# Patient Record
Sex: FEMALE | Race: WHITE
[De-identification: names, ages, dates, MRNs, and addresses within clinical notes are randomized per-mention and may not be internally consistent; named-entity substitution may affect disease eponyms.]

---

## 2018-01-30 ENCOUNTER — HOSPITAL ENCOUNTER (OUTPATIENT)
Dept: HOSPITAL 96 - M.ULTRA | Age: 68
End: 2018-01-30
Attending: INTERNAL MEDICINE
Payer: MEDICARE

## 2018-01-30 DIAGNOSIS — I87.2: Primary | ICD-10-CM

## 2020-10-06 ENCOUNTER — HOSPITAL ENCOUNTER (OUTPATIENT)
Dept: HOSPITAL 96 - M.ULTRA | Age: 70
End: 2020-10-06
Attending: INTERNAL MEDICINE
Payer: MEDICARE

## 2020-10-06 DIAGNOSIS — M25.462: Primary | ICD-10-CM

## 2020-10-06 DIAGNOSIS — M71.22: ICD-10-CM

## 2020-12-07 ENCOUNTER — HOSPITAL ENCOUNTER (OUTPATIENT)
Dept: HOSPITAL 96 - M.CRD | Age: 70
End: 2020-12-07
Attending: INTERNAL MEDICINE
Payer: MEDICARE

## 2020-12-07 DIAGNOSIS — R94.31: ICD-10-CM

## 2020-12-07 DIAGNOSIS — I07.1: Primary | ICD-10-CM

## 2020-12-07 NOTE — CARDNUC
[place] [address]
Phone:  [phone]                     CARDIAC NUCLEAR IMAGING REPORT
_______________________________________________________________________________
 
Name:         YESICLAUDIA KATHERIN          Room:                     REG CLI
M.R.#:    [id number]     Account #:     [id number]  
Admission:    12/07/20    Attend Phys:   LAURA Bass, 
Discharge:                Date of Birth: [birth year]  
Date of Service: 12/07/20 1721  Report #:      [id number]
        [id number] 
_______________________________________________________________________________
THIS REPORT FOR:
 
cc:  Lucille Parker MD, Lin W. MD Liston, Michael J. MD [facility]     
                                                                       ~
 
--------------- APPROVED REPORT --------------
 
 
Study performed:  12/07/2020 15:40:06
 
Exam: Nuclear Stress Test
Indication: Chest pain, ABN EKG, Palpitations.
Patient Location: Out-Patient
Stress Tech: Emily Godoy
Stress Nurse: Staci Tolbert R.N.
NM Tech:SIVA Thayer
 
Ht: 5 ft 4 in  Wt: 163 lbs  BSA:  1.79 m2
    BMI:  27.97
 
Medical History
Medical History: Chest pain, ABN EKG, Palpitations, SB, IVCD, 
Catherization, Superficial venous thrombosis, Knee 
pain/Limping.
Medications: No cardiac medications.
Allergies: Hydromorphone, Darvocet, Amoxicillin, 
Propoxyphene.
Cardiac Risk Factors: Age, FHX of CAD, ABN EKG, Palpitations, SB, 
IVCD, past catherization.
Previous Cardiac Procedures: Catherization 2010.
Pretest Chest Pain Characteristics: No chest pain
Exercise History: Indeterminate
Physical Disabilities: Knee pain, limping. Patient refuses treadmill 
d/t knee pain.
Meds Held (24 hrs): None
 
Stress Test Details
Stress Test:  Pharmacologic stress testing performed using 0.4 mg of 
regadenoson per 5 mL given IV over 10 seconds.      
  Reason for pharmacologic stress test: Knee pain, limping. Patient 
refuses treadmill d/t knee pain..      
  Reversal agent Aminophyline  mg, given intravenously for patient 
refused PO caffeine..      
HR           
 
 
[place] [address]
Phone:  [phone]                     CARDIAC NUCLEAR IMAGING REPORT
_______________________________________________________________________________
 
Name:         CLAUDIA KEY          Room:                     REG CLI
M.R.#:    [id number]     Account #:     [id number]  
Admission:    12/07/20    Attend Phys:   LAURA Bass, 
Discharge:                Date of Birth: [birth year]  
Date of Service: 12/07/20 1721  Report #:      [id number]
        [id number] 
_______________________________________________________________________________
Resting HR:            57 bpm   Max Heart Rate (APMHR): 150 bpm  
Max HR Achieved:  115 bpm   Target HR (85% APMHR): 127 bpm  
% of APMHR:         76         
Recovery HR:            79 bpm        
 
BP           
Resting BP:  127/70 mmHg        
Max BP:       135/69 mmHg        
 
ECG           
Resting ECG:  Sinus Rhythm, LBBB       
Stress ECG:     Sinus tachycardia, LBBB       
ST Change: None          
Arrhythmia:    None         
Recovery ECG: Sinus Rhythm, LBBB       
Recovery ST Change: None        
Recovery Arrhythmia: None        
 
Clinical
Reason for Termination: Completed protocol
Stress Symptoms: Lightheaded, stomach discomfort, tingling in 
hands.
Exercise duration: 00 min 00 sec
Exercise capacity: 1.00 METs
The patient had no significant cardiac symptoms with Lexiscan 
infusion.
 
Nurse Comments
A 70 year old female presented for a Treadmill Nuclear Stress Test 
but refused treadmill d/t painful knee causing a limp. Lexiscan test 
tolerated. Recovery unremarkable. Patient was stable and stated she 
felt good when escorted to Nuclear Medicine for imaging.
 
Stress ECG Conclusion
The baseline twelve-lead EKG shows sinus rhythm with left bundle 
branch block.  EKGs obtained during and post Lexiscan infusion show 
sinus rhythm and sinus tachycardia with left bundle branch block.  No 
acute changes noted.  There were no stress-induced arrhythmias.
 
NM EXAM: Myocardial Perfusion REST/STRESS
Imaging Protocol: Rest Tc-99m/Stress Tc-99m 1 day
 
Resting Data
Rest SPECT myocardial perfusion imaging was performed in supine 
position 30 minutes following the intravenous injection of 11.9 mCi 
of Tc-99m Sestamibi.
 
 
[place] [address]
Phone:  [phone]                     CARDIAC NUCLEAR IMAGING REPORT
_______________________________________________________________________________
 
Name:         CLAUDIA KEY          Room:                     REG CLI
M.R.#:    [id number]     Account #:     [id number]  
Admission:    12/07/20    Attend Phys:   LAURA Bass, 
Discharge:                Date of Birth: [birth year]  
Date of Service: 12/07/20 1721  Report #:      [id number]
        [id number] 
_______________________________________________________________________________
Time of rest injection: 1350     Date: 12/07/2020
The images were gated to evaluate regional wall motion and calculate 
left ventricular ejection fraction. 
Administration Route: IV
Administration Site: Right AC
 
Pharmacologic Stress
Pharmacologic stress test was performed by injecting Regadenoson 0.4 
mg IV push followed by the intravenous injection of 34.7 mCi of 
Tc-99m Sestamibi.
Time of stress injection: 1530     Date: 12/07/2020
Administration Route: IV
Administration Site: Right AC
Gated Stress SPECT was performed 40 minutes after stress 
injection.
The images were gated to evaluate regional wall motion and calculate 
left ventricular ejection fraction. 
Prone imaging was performed.
 
Study Quality
Study: Fair
Artifact: Moderate Diaphragmatic artifact
 
Study Data
At rest, the left ventricular ejection fraction was 71%..   
Post stress, the left ventricular ejection was 79%..   
TID = 0.82.       
 
Perfusion
Perfusion images show significant diaphragmatic attenuation artifact 
even in prone imaging post-rest.  Wall motion in this region appears 
normal on gated studies.  There is focal photopenia at the apex that 
resolves for the most part with post stress imaging suggesting apical 
thinning artifact.  No reversible defects are identified.
 
Wall Motion
Normal left ventricular wall motion.
 
Nuclear Conclusion
ECG Findings: non-diagnostic
Clinical Findings: negative for ischemia
Nuclear Findings: negative for ischemia
Exercise Capacity: not assessed
Left Ventricular Function: Grossly normal
Perfusion study show no defect to suggest ischemia.  Global LV 
systolic function is normal without obvious wall motion abnormality.  
 
 
[place] [address]
Phone:  [phone]                     CARDIAC NUCLEAR IMAGING REPORT
_______________________________________________________________________________
 
Name:         CLAUDIA KEY          Room:                     REG CLI
SUZI#:    [id number]     Account #:     [id number]  
Admission:    12/07/20    Attend Phys:   LAURA Bass, 
Discharge:                Date of Birth: [birth year]  
Date of Service: 12/07/20 1721  Report #:      [id number]
        [id number] 
_______________________________________________________________________________
Inferior wall somewhat difficult to evaluate due to diaphragmatic 
attenuation artifact.  This is not a high risk study. 
 
<Conclusion>
The baseline twelve-lead EKG shows sinus rhythm with left bundle 
branch block.  EKGs obtained during and post Lexiscan infusion show 
sinus rhythm and sinus tachycardia with left bundle branch block.  No 
acute changes noted.  There were no stress-induced arrhythmias.
 
 
 
 
 
 
 
 
 
 
 
 
 
 
 
 
 
 
 
 
 
 
 
 
 
 
 
 
 
 
 
 
 
 
 
 
  <ELECTRONICALLY SIGNED>
                                           By: Tu Cabral MD, FACC   
  12/07/20 1721
D: 12/07/20 1721   _____________________________________
T: 12/07/20 1721   Tu Cabral MD, FACC     /INF

## 2020-12-07 NOTE — 2DMMODE
[place] [address]
Phone:  [phone] 2 D/M-MODE ECHOCARDIOGRAM     
_______________________________________________________________________________
 
Name:         CLAUDIA KEY          Room:                     REG CLI
M.R.#:    [id number]     Account #:     [id number]  
Admission:    20    Attend Phys:   LAURA Bass, 
Discharge:                Date of Birth: [birth year]  
Date of Service: 20 1506  Report #:      [id number]
        [id number]
_______________________________________________________________________________
THIS REPORT FOR:
 
cc:  Lucille Parker MD, Lin W. MD Blick, David R. MD [facility]        
                                                                       ~
 
--------------- APPROVED REPORT --------------
 
 
Study performed:  2020 12:43:19
 
EXAM: Comprehensive 2D, Doppler, and color-flow 
Echocardiogram 
Patient Location: Out-Patient   
 
      BSA:         1.83
HR: 62 bpm BP:          117/67 mmHg 
 
Other Information 
Study Quality: Good
 
Indications
Palpitations
Chest Pain
 
2D Dimensions
IVSd:  12.09 (7-11mm) LVOT Diam:  19.24 (18-24mm) 
LVDd:  45.66 mm  
PWd:  10.87 (7-11mm) Ascending Ao:  34.04 (22-36mm)
LVDs:  28.39 (25-40mm) 
Aortic Root:  28.04 mm 
 
Volumes
Left Atrial Volume (Systole) 
    LA ESV Index:  12.20 mL/m2
 
Aortic Valve
AoV Peak Pablo.:  1.52 m/s 
AO Peak Gr.:  9.26 mmHg  LVOT Max P.67 mmHg
AO Mean Gr.:  4.98 mmHg  LVOT Mean P.35 mmHg
    LVOT Max V:  0.82 m/s
AO V2 VTI:  33.19 cm  LVOT Mean V:  0.53 m/s
FERDINAND (VTI):  1.82 cm2  LVOT V1 VTI:  20.84 cm
 
Mitral Valve
 
 
[place] [address]
Phone:  [phone]                     2 D/M-MODE ECHOCARDIOGRAM     
_______________________________________________________________________________
 
Name:         CLAUDIA KYE          Room:                     REG CLI
M.R.#:    [id number]     Account #:     [id number]  
Admission:    20    Attend Phys:   LAURA Bass, 
Discharge:                Date of Birth: [birth year]  
Date of Service: 20 1506  Report #:      [id number]
        [id number]
_______________________________________________________________________________
    E/A Ratio:  0.67
    MV Decel. Time:  294.22 ms
MV E Max Pablo.:  0.66 m/s 
MV PHT:  85.32 ms  
MVA (PHT):  2.58 cm2  
 
TDI
E/Lateral E':  8.25 E/Medial E':  8.25
   Medial E' Pablo.:  0.08 m/s
   Lateral E' Pablo.:  0.08 m/s
 
Pulmonary Valve
PV Peak Pablo.:  0.94 m/s PV Peak Gr.:  3.50 mmHg
 
Tricuspid Valve
    RAP Estimate:  5.00 mmHg
TR Peak Gr.:  21.65 mmHg RVSP:  26.65 mmHg
    PA Pressure:  26.65 mmHg
 
Left Ventricle
The left ventricle is normal size. There is normal LV segmental wall 
motion. There is normal left ventricular wall thickness. Left 
ventricular systolic function is normal. The left ventricular 
ejection fraction is within the normal range. LVEF is 55-60%. Grade I 
- abnormal relaxation pattern.
 
Right Ventricle
The right ventricle is normal size. The right ventricular systolic 
function is normal.
 
Atria
The left atrium size is normal. The right atrium size is 
normal.
 
Aortic Valve
The aortic valve is normal in structure. No aortic regurgitation is 
present. There is no aortic valvular stenosis.
 
Mitral Valve
The mitral valve is normal in structure. Trace mitral regurgitation. 
No evidence of mitral valve stenosis.
 
Tricuspid Valve
The tricuspid valve is normal in structure. Mild tricuspid 
regurgitation.
 
 
 
[place] [address]
Phone:  [phone] 2 D/M-MODE ECHOCARDIOGRAM     
_______________________________________________________________________________
 
Name:         CLAUDIA KEY          Room:                     REG CLI
M.R.#:    [id number]     Account #:     [id number]  
Admission:    20    Attend Phys:   LAURA Bass, 
Discharge:                Date of Birth: [birth year]  
Date of Service: 20 1506  Report #:      [id number]
        [id number]
_______________________________________________________________________________
Pulmonic Valve
The pulmonary valve is normal in structure. There is no pulmonic 
valvular regurgitation.
 
Great Vessels
The aortic root is normal in size. IVC is normal in size and 
collapses >50% with inspiration.
 
Pericardium
There is no pericardial effusion.
 
<Conclusion>
Left ventricular systolic function is normal.
The left ventricular ejection fraction is within the normal range.
 
 
 
 
 
 
 
 
 
 
 
 
 
 
 
 
 
 
 
 
 
 
 
 
 
 
 
 
 
 
  <ELECTRONICALLY SIGNED>
                                           By: King Hines MD, [facility]      
  20     1506
D: 20 1506   _____________________________________
T: 20 1506   King Hines MD, FACC        /INF